# Patient Record
Sex: MALE | Race: WHITE | NOT HISPANIC OR LATINO
[De-identification: names, ages, dates, MRNs, and addresses within clinical notes are randomized per-mention and may not be internally consistent; named-entity substitution may affect disease eponyms.]

---

## 2022-06-20 PROBLEM — Z00.00 ENCOUNTER FOR PREVENTIVE HEALTH EXAMINATION: Status: ACTIVE | Noted: 2022-06-20

## 2022-07-01 ENCOUNTER — APPOINTMENT (OUTPATIENT)
Dept: UROLOGY | Facility: CLINIC | Age: 32
End: 2022-07-01

## 2022-07-01 VITALS
DIASTOLIC BLOOD PRESSURE: 94 MMHG | HEIGHT: 75 IN | HEART RATE: 87 BPM | BODY MASS INDEX: 38.54 KG/M2 | WEIGHT: 310 LBS | SYSTOLIC BLOOD PRESSURE: 135 MMHG

## 2022-07-01 DIAGNOSIS — Z78.9 OTHER SPECIFIED HEALTH STATUS: ICD-10-CM

## 2022-07-01 PROCEDURE — 99203 OFFICE O/P NEW LOW 30 MIN: CPT

## 2022-07-14 PROBLEM — Z78.9 KNOWN HEALTH PROBLEMS: NONE: Status: RESOLVED | Noted: 2022-07-01 | Resolved: 2022-07-14

## 2022-07-14 PROBLEM — Z78.9 SOCIAL ALCOHOL USE: Status: ACTIVE | Noted: 2022-07-01

## 2022-07-14 PROBLEM — Z78.9 NON-SMOKER: Status: ACTIVE | Noted: 2022-07-01

## 2022-07-14 NOTE — PHYSICAL EXAM
[General Appearance - Well Developed] : well developed [Edema] : no peripheral edema [] : no respiratory distress [Abdomen Hernia] : no hernia was discovered [Abdomen Soft] : soft [Urethral Meatus] : meatus normal [Penis Abnormality] : normal circumcised penis [Urinary Bladder Findings] : the bladder was normal on palpation [Scrotum] : the scrotum was normal [Testes Tenderness] : no tenderness of the testes [Testes Mass (___cm)] : there were no testicular masses [FreeTextEntry1] : atrophic left testis [Normal Station and Gait] : the gait and station were normal for the patient's age [Oriented To Time, Place, And Person] : oriented to person, place, and time [Inguinal Lymph Nodes Enlarged Bilaterally] : inguinal

## 2022-07-24 ENCOUNTER — TRANSCRIPTION ENCOUNTER (OUTPATIENT)
Age: 32
End: 2022-07-24

## 2022-07-28 ENCOUNTER — APPOINTMENT (OUTPATIENT)
Dept: UROLOGY | Facility: CLINIC | Age: 32
End: 2022-07-28

## 2022-07-28 PROCEDURE — 99442: CPT

## 2022-09-06 ENCOUNTER — APPOINTMENT (OUTPATIENT)
Dept: UROLOGY | Facility: CLINIC | Age: 32
End: 2022-09-06

## 2022-09-06 PROCEDURE — 99442: CPT

## 2022-09-13 ENCOUNTER — APPOINTMENT (OUTPATIENT)
Dept: UROLOGY | Facility: CLINIC | Age: 32
End: 2022-09-13

## 2022-09-13 PROCEDURE — 99442: CPT

## 2023-03-17 ENCOUNTER — APPOINTMENT (OUTPATIENT)
Dept: UROLOGY | Facility: CLINIC | Age: 33
End: 2023-03-17
Payer: COMMERCIAL

## 2023-03-17 ENCOUNTER — NON-APPOINTMENT (OUTPATIENT)
Age: 33
End: 2023-03-17

## 2023-03-17 VITALS
SYSTOLIC BLOOD PRESSURE: 115 MMHG | OXYGEN SATURATION: 96 % | WEIGHT: 297 LBS | HEIGHT: 75 IN | DIASTOLIC BLOOD PRESSURE: 77 MMHG | BODY MASS INDEX: 36.93 KG/M2 | TEMPERATURE: 96.5 F | HEART RATE: 98 BPM

## 2023-03-17 DIAGNOSIS — Z31.69 ENCOUNTER FOR OTHER GENERAL COUNSELING AND ADVICE ON PROCREATION: ICD-10-CM

## 2023-03-17 PROCEDURE — 93975 VASCULAR STUDY: CPT

## 2023-03-17 PROCEDURE — 99214 OFFICE O/P EST MOD 30 MIN: CPT | Mod: 25

## 2023-03-17 RX ORDER — URSODIOL 250 MG/1
250 TABLET ORAL
Refills: 0 | Status: ACTIVE | COMMUNITY

## 2023-03-20 LAB
ESTRADIOL SERPL-MCNC: 30 PG/ML
FSH SERPL-MCNC: 19.2 IU/L
LH SERPL-ACNC: 5.6 IU/L
TESTOST FREE SERPL-MCNC: 8.9 PG/ML
TESTOST SERPL-MCNC: 380 NG/DL

## 2023-03-20 NOTE — PHYSICAL EXAM
[Urethral Meatus] : meatus normal [Penis Abnormality] : normal circumcised penis [Scrotum] : the scrotum was normal [Epididymis] : the epididymides were normal [Testes Tenderness] : no tenderness of the testes [Testes Mass (___cm)] : there were no testicular masses [FreeTextEntry1] : 10 - 12 cc bilateral testis. Bilateral present vas. Left Grade II Varicocele. Right grade I VARICOCELE

## 2023-03-20 NOTE — HISTORY OF PRESENT ILLNESS
[FreeTextEntry1] : 31 yo. No PMH.\par  to Alicia Bennett, 31 yo. Everything OK, reports has shorter periods.\par Trying to conceive for 2 years. \par Referred by Dr Blevins.\par \par SA 08/2022\par 2 mL azoospermic\par \par Blood workup 05/2022\par \par FSH 19.3\par LH 4.3\par \par Scrotal US 07/2022\par - Small left sided varicocele.\par \par No other complaint.\par No ED. No ejaculatory dysfunction.\par Nonsmoker.\par

## 2023-03-20 NOTE — ASSESSMENT
[FreeTextEntry1] : Hypogonadism - Azoospermia.\par TT FSH LH E2\par New SA\par Karyotype\par Anti sperm Ab\par Y microdeletion. \par CF panel\par Repeat sonogram -3.4mm left varicocele\par role varicocelectomy in nonobsturctive azoo reviewed\par ~20% recovery of sperm to ejac\par improved sensitivity of microTESE\par role upfront microTESE discussed\par f/u 2-4 weeliel

## 2023-03-23 LAB — CFTR MUT TESTED BLD/T: NEGATIVE

## 2023-03-29 LAB — Y CHROMOSOME MICRODELETION, DNA ANALYSIS: NORMAL

## 2023-04-03 LAB
BINDING LOCATION IGA: NORMAL
SPERM AB SMN QL: <20 % BINDING
SPERM IGG BIND LOCATION SMN: NORMAL
SPERM IGG NFR SER IBT: <20 % BINDING

## 2023-05-12 ENCOUNTER — APPOINTMENT (OUTPATIENT)
Dept: UROLOGY | Facility: CLINIC | Age: 33
End: 2023-05-12
Payer: COMMERCIAL

## 2023-05-12 VITALS — SYSTOLIC BLOOD PRESSURE: 130 MMHG | HEART RATE: 73 BPM | DIASTOLIC BLOOD PRESSURE: 77 MMHG | TEMPERATURE: 98.1 F

## 2023-05-12 PROCEDURE — 99214 OFFICE O/P EST MOD 30 MIN: CPT | Mod: 57

## 2023-05-12 NOTE — HISTORY OF PRESENT ILLNESS
[FreeTextEntry1] : returns with his wife\par sono 3.4mm left varicocele\par SA azoo\par normal genetcs\par neg ASA\par FSH 19\par \par here to review

## 2023-05-12 NOTE — ASSESSMENT
[FreeTextEntry1] : left varicocele\par azoospermia\par We had an in depth conversation regarding the benefit of varicocelectomy today. 20% likelihood induction of spermatogenesis per the literature (weak data). weak data suggesting better TESE success rates. We also spoke about the fact that there is a 3 to 6 month delay before improvement is seen in semen parameters. The role of advancing maternal age was discussed in depth.\par \par Surgical risks, including a risk of infection, injury to the testicular artery (extremely rare), injury to the vas deferens and hydrocele were discussed, as was post operative pain and pain control. Post operative skin numbness in the anterior thigh/lateral scrotum were also discussed.\par \par understands may require TESE in future\par opts to proceed

## 2023-06-27 ENCOUNTER — TRANSCRIPTION ENCOUNTER (OUTPATIENT)
Age: 33
End: 2023-06-27

## 2023-06-27 RX ORDER — APREPITANT 80 MG/1
40 CAPSULE ORAL ONCE
Refills: 0 | Status: COMPLETED | OUTPATIENT
Start: 2023-06-28 | End: 2023-06-28

## 2023-06-27 RX ORDER — ACETAMINOPHEN 500 MG
1000 TABLET ORAL ONCE
Refills: 0 | Status: COMPLETED | OUTPATIENT
Start: 2023-06-28 | End: 2023-06-28

## 2023-06-27 NOTE — ASU PATIENT PROFILE, ADULT - FALL HARM RISK - UNIVERSAL INTERVENTIONS
Bed in lowest position, wheels locked, appropriate side rails in place/Call bell, personal items and telephone in reach/Instruct patient to call for assistance before getting out of bed or chair/Non-slip footwear when patient is out of bed/[place] to call system/Physically safe environment - no spills, clutter or unnecessary equipment/Purposeful Proactive Rounding/Room/bathroom lighting operational, light cord in reach

## 2023-06-27 NOTE — ASU PATIENT PROFILE, ADULT - VISION (WITH CORRECTIVE LENSES IF THE PATIENT USUALLY WEARS THEM):
Partially impaired: cannot see medication labels or newsprint, but can see obstacles in path, and the surrounding layout; can count fingers at arm's length glasses  24/7 contacts left at home/Partially impaired: cannot see medication labels or newsprint, but can see obstacles in path, and the surrounding layout; can count fingers at arm's length

## 2023-06-27 NOTE — ASU PATIENT PROFILE, ADULT - NS TRANSFER PATIENT BELONGINGS
Jewelry/Money (specify)/Clothing wallet, ring/Cell Phone/PDA (specify)/Jewelry/Money (specify)/Clothing

## 2023-06-27 NOTE — ASU PATIENT PROFILE, ADULT - CAREGIVER RELATION TO PATIENT
Patient is requesting a call from MA only regarding an X-ray on her left foot possible broken toe  Justyna can be reached at [phone]     Wife

## 2023-06-28 ENCOUNTER — TRANSCRIPTION ENCOUNTER (OUTPATIENT)
Age: 33
End: 2023-06-28

## 2023-06-28 ENCOUNTER — APPOINTMENT (OUTPATIENT)
Dept: UROLOGY | Facility: AMBULATORY SURGERY CENTER | Age: 33
End: 2023-06-28

## 2023-06-28 ENCOUNTER — OUTPATIENT (OUTPATIENT)
Dept: OUTPATIENT SERVICES | Facility: HOSPITAL | Age: 33
LOS: 1 days | Discharge: HOME | End: 2023-06-28
Payer: COMMERCIAL

## 2023-06-28 VITALS
HEIGHT: 75 IN | OXYGEN SATURATION: 98 % | TEMPERATURE: 97 F | WEIGHT: 297.62 LBS | RESPIRATION RATE: 14 BRPM | HEART RATE: 77 BPM | DIASTOLIC BLOOD PRESSURE: 71 MMHG | SYSTOLIC BLOOD PRESSURE: 125 MMHG

## 2023-06-28 VITALS
RESPIRATION RATE: 16 BRPM | DIASTOLIC BLOOD PRESSURE: 61 MMHG | HEART RATE: 79 BPM | TEMPERATURE: 97 F | OXYGEN SATURATION: 97 % | SYSTOLIC BLOOD PRESSURE: 126 MMHG

## 2023-06-28 DIAGNOSIS — D23.70 OTHER BENIGN NEOPLASM OF SKIN OF UNSPECIFIED LOWER LIMB, INCLUDING HIP: Chronic | ICD-10-CM

## 2023-06-28 DIAGNOSIS — I86.1 SCROTAL VARICES: ICD-10-CM

## 2023-06-28 PROCEDURE — 55535 REVISE SPERMATIC CORD VEINS: CPT | Mod: LT

## 2023-06-28 DEVICE — CLIP APPLIER COVIDIEN SURGICLIP 13" LARGE: Type: IMPLANTABLE DEVICE | Status: FUNCTIONAL

## 2023-06-28 DEVICE — CLIP APPLIER ETHICON LIGACLIP 9 3/8" SMALL: Type: IMPLANTABLE DEVICE | Status: FUNCTIONAL

## 2023-06-28 RX ORDER — OXYCODONE AND ACETAMINOPHEN 5; 325 MG/1; MG/1
1 TABLET ORAL
Qty: 10 | Refills: 0
Start: 2023-06-28

## 2023-06-28 RX ORDER — DOCUSATE SODIUM 100 MG
1 CAPSULE ORAL
Qty: 30 | Refills: 0
Start: 2023-06-28 | End: 2023-07-27

## 2023-06-28 RX ORDER — OXYCODONE HYDROCHLORIDE 5 MG/1
5 TABLET ORAL ONCE
Refills: 0 | Status: DISCONTINUED | OUTPATIENT
Start: 2023-06-28 | End: 2023-06-28

## 2023-06-28 RX ORDER — ONDANSETRON 8 MG/1
4 TABLET, FILM COATED ORAL ONCE
Refills: 0 | Status: DISCONTINUED | OUTPATIENT
Start: 2023-06-28 | End: 2023-06-28

## 2023-06-28 RX ORDER — SODIUM CHLORIDE 9 MG/ML
500 INJECTION, SOLUTION INTRAVENOUS
Refills: 0 | Status: DISCONTINUED | OUTPATIENT
Start: 2023-06-28 | End: 2023-06-28

## 2023-06-28 RX ORDER — HYDROMORPHONE HYDROCHLORIDE 2 MG/ML
0.5 INJECTION INTRAMUSCULAR; INTRAVENOUS; SUBCUTANEOUS
Refills: 0 | Status: DISCONTINUED | OUTPATIENT
Start: 2023-06-28 | End: 2023-06-28

## 2023-06-28 RX ADMIN — SODIUM CHLORIDE 100 MILLILITER(S): 9 INJECTION, SOLUTION INTRAVENOUS at 11:46

## 2023-06-28 RX ADMIN — APREPITANT 40 MILLIGRAM(S): 80 CAPSULE ORAL at 07:03

## 2023-06-28 RX ADMIN — Medication 1000 MILLIGRAM(S): at 07:02

## 2023-06-28 NOTE — ASU DISCHARGE PLAN (ADULT/PEDIATRIC) - ASU DC SPECIAL INSTRUCTIONSFT
VARICOCELECTOMY    SURGICAL WOUND: There are often lumps and bumps that can be felt in the scrotum on either or both sides up to two (2) months or more post operatively. These are of no concern and with time they will soften and disappear.  Any “black and blue” bruising areas are expected and will also resolve over weeks.  Normally, there is also swelling of the scrotum post operatively. Sometimes the tissue fluid which causes the swelling migrates to the penile skin and can look alarming; with time, all the swelling will eventually subside but may take weeks.  A scrotal support and scrotal fluffs should be worn at all times for the next few weeks, unless bathing, to minimize this swelling. You may apply an ice-pack for 15 minutes out of every hour for the first 24 -36 hours to minimize pain and swelling.    STITCHES: The stitches in the incision will dissolve and fall out by themselves. Sometimes skin stitches may open, allowing a slight gaping of the incision. This is no problem if you keep the area clean.  There may be a bluish colored waterproof glue over the incision as well – the glue will peel away and fall off on its own over a couple of weeks.     PAIN: You may have some intermittent pain for up to six (6) weeks post operatively. Pain does not signify any problem unless associated with fever, chills, or inability to void.  If you experience any fevers or chills please call immediately as this may be signs of an infection. You may take Tylenol (acetaminophen) 650-975mg and/or Motrin (ibuprofen) 400-600mg, both available over the counter, for pain every 6 hours as needed. Do not exceed 4000mg of Tylenol (acetaminophen) daily. You may alternate these medications such that you take one or the other every 3 hours for around the clock pain coverage. For severe pain, take Percocet 5/325mg every 6 hours.      STOOL SOFTENERS: Do not allow yourself to become constipated as straining may cause bleeding. Take stool softeners or a laxative (ex. Miralax, Colace, Senokot, ExLax, etc), available over the counter, if taking Percocet. For your convenience, all prescriptions are sent to [facility] pharmacy at [address] [place] [address], on the [address] and [address] [place] [address].    ANTICOAGULATION: If you are taking any blood thinning medications, please discuss with your urologist prior to restarting these medications unless otherwise specified.    BATHING: You may sponge bath 24 hours after surgery, but minimize water to the surgical incision and drain.    DIET: You may resume your regular diet and regular medication regimen.    ACTIVITY: No heavy lifting or strenuous exercise until you are evaluated at your post-operative appointment. Otherwise, you may return to your usual level of physical activity.    FOLLOW-UP: If you did not already schedule your post-operative appointment, please call your urologist to schedule and follow-up appointment.    CALL YOUR UROLOGIST IF: You have any bleeding that does not stop, inability to void >8 hours, fever over 100.4 F, chills, persistent nausea/vomiting, changes in your incision concerning for infection, or if your pain is not controlled on your discharge pain medications.

## 2023-06-28 NOTE — ASU DISCHARGE PLAN (ADULT/PEDIATRIC) - NS MD DC FALL RISK RISK
For information on Fall & Injury Prevention, visit: [email or web address] OR  [email or web address] OR  https://www.cdc.gov/steadi/patient.html

## 2023-07-06 ENCOUNTER — NON-APPOINTMENT (OUTPATIENT)
Age: 33
End: 2023-07-06

## 2023-07-12 ENCOUNTER — APPOINTMENT (OUTPATIENT)
Dept: UROLOGY | Facility: CLINIC | Age: 33
End: 2023-07-12
Payer: COMMERCIAL

## 2023-07-12 VITALS — SYSTOLIC BLOOD PRESSURE: 128 MMHG | TEMPERATURE: 98.3 F | HEART RATE: 113 BPM | DIASTOLIC BLOOD PRESSURE: 75 MMHG

## 2023-07-12 PROBLEM — I86.1 LEFT VARICOCELE: Status: ACTIVE | Noted: 2023-05-12

## 2023-07-12 PROCEDURE — 99024 POSTOP FOLLOW-UP VISIT: CPT

## 2023-07-12 NOTE — PHYSICAL EXAM
[Normal Appearance] : normal appearance [General Appearance - In No Acute Distress] : no acute distress [Abdomen Hernia] : no hernia was discovered [Urethral Meatus] : meatus normal [Scrotum] : the scrotum was normal [Testes Mass (___cm)] : there were no testicular masses [FreeTextEntry1] : R incision site healed well, nonerythematous, non tender.  [Heart Rate And Rhythm] : Heart rate and rhythm were normal [Edema] : no peripheral edema [] : no respiratory distress [Respiration, Rhythm And Depth] : normal respiratory rhythm and effort [Exaggerated Use Of Accessory Muscles For Inspiration] : no accessory muscle use [Oriented To Time, Place, And Person] : oriented to person, place, and time [Affect] : the affect was normal [Normal Station and Gait] : the gait and station were normal for the patient's age

## 2023-07-12 NOTE — HISTORY OF PRESENT ILLNESS
[FreeTextEntry1] : 32M here for follow up 2 weeks s/p R varicocele correction\par \par - feels well, without complaints.\par - endorses minimal pain following initial episode, which was relieved through use of ibuprofen and scrotal support. \par - denies dysuria, fever, chills, hematuria, N/V/D, discharge\par - underwent for fertility.

## 2023-07-12 NOTE — ASSESSMENT
[FreeTextEntry1] : 32M here for follow up s/p correction of R varicocele \par \par Varicocele\par - advised to wear supportive underwear. \par - to repeat SA in 3 months\par - reviewed worry symptoms with patient \par

## 2023-07-21 RX ORDER — LEVOCETIRIZINE DIHYDROCHLORIDE 0.5 MG/ML
1 SOLUTION ORAL
Refills: 0 | DISCHARGE

## 2024-03-07 ENCOUNTER — NON-APPOINTMENT (OUTPATIENT)
Age: 34
End: 2024-03-07

## 2024-03-11 PROBLEM — G47.33 OBSTRUCTIVE SLEEP APNEA (ADULT) (PEDIATRIC): Chronic | Status: ACTIVE | Noted: 2023-06-28

## 2024-04-12 ENCOUNTER — APPOINTMENT (OUTPATIENT)
Dept: UROLOGY | Facility: CLINIC | Age: 34
End: 2024-04-12
Payer: COMMERCIAL

## 2024-04-12 VITALS
DIASTOLIC BLOOD PRESSURE: 82 MMHG | TEMPERATURE: 98.6 F | OXYGEN SATURATION: 97 % | SYSTOLIC BLOOD PRESSURE: 142 MMHG | HEART RATE: 86 BPM

## 2024-04-12 PROCEDURE — 99214 OFFICE O/P EST MOD 30 MIN: CPT

## 2024-04-12 NOTE — HISTORY OF PRESENT ILLNESS
[FreeTextEntry1] : 33M hypogonadism primary infertility azoospermia  left varicocele s/p varicocelectomy 6/28/23 remains azoospermia  Labs 5/12/23 FSH 19.2 LH 5.6 E2 30   SA (02/12/24): vol 1.9 azoospermia

## 2024-04-12 NOTE — ASSESSMENT
[FreeTextEntry1] : A/P: 33M w/ hypogonadism - primary infertility - s/p left varicocelectomy 06/28/23 - remains azoospermic - discussed extended search SA vs TESE vs mTESE I had a long conversation with him and his wife regarding the role of surgical sperm retrieval. They understand that some patients harbor pockets of spermatogenesis within the testis that can be retrieved surgically and used with IVF. In general, there is an approximately 45-60% likelihood of successful retrieval. Retrieval options, including a complete microdissection of the testis and more limited testicular biopsies were also discussed. They understand that the microTESE may have the best likelihood of identifying spermatogenesis. Risks of surgery, including hematoma, pain, long term hypogonadism requiring testosterone replacement therapy, were also discussed. will undergo eval with IVF team will review info may schedule by phone - needs to decide if needle based sampling vs microTESE

## 2024-04-23 ENCOUNTER — APPOINTMENT (OUTPATIENT)
Dept: HUMAN REPRODUCTION | Facility: CLINIC | Age: 34
End: 2024-04-23

## 2024-07-15 ENCOUNTER — APPOINTMENT (OUTPATIENT)
Dept: BARIATRICS/WEIGHT MGMT | Facility: CLINIC | Age: 34
End: 2024-07-15

## 2024-07-15 VITALS
HEART RATE: 87 BPM | SYSTOLIC BLOOD PRESSURE: 149 MMHG | WEIGHT: 315 LBS | DIASTOLIC BLOOD PRESSURE: 86 MMHG | RESPIRATION RATE: 18 BRPM | BODY MASS INDEX: 39.37 KG/M2 | OXYGEN SATURATION: 99 %

## 2024-07-15 DIAGNOSIS — E66.01 MORBID (SEVERE) OBESITY DUE TO EXCESS CALORIES: ICD-10-CM

## 2024-07-15 DIAGNOSIS — K76.0 FATTY (CHANGE OF) LIVER, NOT ELSEWHERE CLASSIFIED: ICD-10-CM

## 2024-07-15 DIAGNOSIS — R74.8 ABNORMAL LEVELS OF OTHER SERUM ENZYMES: ICD-10-CM

## 2024-07-15 DIAGNOSIS — Z00.00 ENCOUNTER FOR GENERAL ADULT MEDICAL EXAMINATION W/OUT ABNORMAL FINDINGS: ICD-10-CM

## 2024-07-15 DIAGNOSIS — E78.1 PURE HYPERGLYCERIDEMIA: ICD-10-CM

## 2024-07-15 DIAGNOSIS — E88.810 METABOLIC SYNDROME: ICD-10-CM

## 2024-07-15 PROCEDURE — 99205 OFFICE O/P NEW HI 60 MIN: CPT

## 2024-07-15 PROCEDURE — G2211 COMPLEX E/M VISIT ADD ON: CPT | Mod: NC

## 2024-07-15 RX ORDER — SEMAGLUTIDE 0.5 MG/.5ML
0.5 INJECTION, SOLUTION SUBCUTANEOUS
Qty: 1 | Refills: 1 | Status: ACTIVE | COMMUNITY
Start: 2024-07-15 | End: 1900-01-01

## 2024-07-15 RX ORDER — MULTIVITAMIN/IRON/FOLIC ACID 18MG-0.4MG
TABLET ORAL
Refills: 0 | Status: ACTIVE | COMMUNITY
Start: 2024-07-15

## 2024-07-15 RX ORDER — SEMAGLUTIDE 0.25 MG/.5ML
0.25 INJECTION, SOLUTION SUBCUTANEOUS
Qty: 2 | Refills: 1 | Status: ACTIVE | COMMUNITY
Start: 2024-07-15 | End: 1900-01-01

## 2024-08-01 PROBLEM — Z82.49 FAMILY HISTORY OF HEART DISEASE: Status: ACTIVE | Noted: 2024-08-01

## 2024-08-01 PROBLEM — Z83.3 FAMILY HISTORY OF TYPE 2 DIABETES MELLITUS: Status: ACTIVE | Noted: 2024-08-01

## 2024-08-01 PROBLEM — Z91.89 SEDENTARY LIFESTYLE: Status: ACTIVE | Noted: 2024-08-01

## 2024-08-14 NOTE — ASSESSMENT
[FreeTextEntry1] : Patient is a 31 year male who presents with failure to conceive.  He has ahd a workup with labs and semen analysis that shows azospermia.  No prior hernia or testicular surgery or trauma.  Non smoker.\par \par A/P\par 1. azospermia\par repeat semen analysis and labs\par will call No

## 2024-08-22 ENCOUNTER — APPOINTMENT (OUTPATIENT)
Dept: BARIATRICS/WEIGHT MGMT | Facility: CLINIC | Age: 34
End: 2024-08-22
Payer: COMMERCIAL

## 2024-08-22 DIAGNOSIS — E88.810 METABOLIC SYNDROME: ICD-10-CM

## 2024-08-22 DIAGNOSIS — E78.1 PURE HYPERGLYCERIDEMIA: ICD-10-CM

## 2024-08-22 DIAGNOSIS — R74.8 ABNORMAL LEVELS OF OTHER SERUM ENZYMES: ICD-10-CM

## 2024-08-22 DIAGNOSIS — K76.0 FATTY (CHANGE OF) LIVER, NOT ELSEWHERE CLASSIFIED: ICD-10-CM

## 2024-08-22 DIAGNOSIS — E66.01 MORBID (SEVERE) OBESITY DUE TO EXCESS CALORIES: ICD-10-CM

## 2024-08-22 PROCEDURE — 99214 OFFICE O/P EST MOD 30 MIN: CPT

## 2024-08-22 PROCEDURE — G2211 COMPLEX E/M VISIT ADD ON: CPT | Mod: NC

## 2024-08-23 NOTE — HISTORY OF PRESENT ILLNESS
[FreeTextEntry1] : 33M PMH class II obesity, elevated triglycerides, low HDL, MARLY, exercise-induced asthma, who presents to weight management for follow-up.   She works in [place] and initially presented 7/2024 reporting that he struggled with his weight throughout life, engaged in various diets with regain. Diet noted for skipped breakfasts, occasional desserts, some night snacking, occasional take-out. Sedentary lifestyle. Dx w/MARLY, uses CPAP. Testosterone may be affected by MARLY and excess adipose tissue/diet. We discussed dietary and lifestyle changes.  We discussed medical treatments, he was prescribed Wegovy.    Weight today:  Weight at Initial Visit (7/15/24): 315 lbs, BMI 39.37, BF 43.1%   Medication: He has not started Wegovy, needs PA.   Diet: (B (): Usually skips breakfast L (noon): Leftovers, or a sandwich D (6 pm): 'heartiest meal', chicken or steak w/ rice and vegetable. Pasta on weekends Dessert: rare Carvel Snack: just at night.  Night-time eating: a few pieces of cheese and ham at night, ~8:30 Beverages: Coffee with milk 2% x1, sometimes diet soda <1x/d Fast-food/Restaurants: Take-out lunch most days, dinner restaurant 1-2x/wk Cook/Prepare meals: Added oils: Food Journal: In the past   Exercise:  (No limitations. Occasional walks.    Sleep:  (Was diagnosed with MARLY 6-7 years ago. Uses CPAP which has improved sleep. Typically falls asleep 10:30 pm, up ~7:30 am, estimates 8 hours.   Labs (6/5/23): CBC, CMP, Trig 555, HDL 38, DLDL 108, TSH 1.6. Total/free testosterone (3/17/23)  380, 8.9

## 2024-08-23 NOTE — HISTORY OF PRESENT ILLNESS
[FreeTextEntry1] : 33M PMH class II obesity, elevated triglycerides, low HDL, MARLY, exercise-induced asthma, who presents to weight management for follow-up.   She works in [place] and initially presented 7/2024 reporting that he struggled with his weight throughout life, engaged in various diets with regain. Diet noted for skipped breakfasts, occasional desserts, some night snacking, occasional take-out. Sedentary lifestyle. Dx w/MARLY, uses CPAP. Testosterone may be affected by MARLY and excess adipose tissue/diet. We discussed dietary and lifestyle changes.  We discussed medical treatments, he was prescribed Wegovy.    Weight today:  Weight at Initial Visit (7/15/24): 315 lbs, BMI 39.37, BF 43.1%   Medication: He has not started Wegovy, needs PA.   Diet: (B (): Usually skips breakfast L (noon): Leftovers, or a sandwich D (6 pm): 'heartiest meal', chicken or steak w/ rice and vegetable. Pasta on weekends Dessert: rare Carvel Snack: just at night.  Night-time eating: a few pieces of cheese and ham at night, ~8:30 Beverages: Coffee with milk 2% x1, sometimes diet soda <1x/d Fast-food/Restaurants: Take-out lunch most days, dinner restaurant 1-2x/wk Cook/Prepare meals: Added oils: Food Journal: In the past   Exercise:  (No limitations. Occasional walks.    Sleep:  (Was diagnosed with MARLY 6-7 years ago. Uses CPAP which has improved sleep. Typically falls asleep 10:30 pm, up ~7:30 am, estimates 8 hours.   Labs (6/5/23): CBC, CMP, Trig 555, HDL 38, DLDL 108, TSH 1.6. Total/free testosterone (3/17/23)  380, 8.9

## 2024-08-23 NOTE — PHYSICAL EXAM
[Obese, well nourished, in no acute distress] : obese, well nourished, in no acute distress [de-identified] : TEB visit

## 2024-08-23 NOTE — PHYSICAL EXAM
[Obese, well nourished, in no acute distress] : obese, well nourished, in no acute distress [de-identified] : TEB visit

## 2024-08-23 NOTE — PHYSICAL EXAM
[Obese, well nourished, in no acute distress] : obese, well nourished, in no acute distress [de-identified] : TEB visit

## 2024-08-23 NOTE — ASSESSMENT
[FreeTextEntry1] : 33M PMH class II obesity, elevated triglycerides, low HDL, MARLY, exercise-induced asthma, who presents to weight management for follow-up.   # Metabolic syndrome (class II obesity - central, elevated trig, low HDL) c/b MARLY, borderline low T: Has not yet started Wegovy, needs PA? Has struggled with his weight throughout life, engaged in various diets with regain. Diet noted for skipped breakfasts, occasional desserts, some night snacking, occasional take-out. Sedentary lifestyle. Has MARLY, uses CPAP. Testosterone may be affected by MARLY and excess adipose tissue.  - Food log - Meal planning/prep, may benefit from meal delivery service - Defers dietician at the moment.  - Incorporate resistance exercise.  - labs reviewed with patient - Wegovy prescribed, will f/u on PA - F/u 1 month    PA wegovy sweeteners labs discussed

## 2024-08-23 NOTE — HISTORY OF PRESENT ILLNESS
[FreeTextEntry1] : 33M PMH class II obesity, elevated triglycerides, low HDL, MARLY, exercise-induced asthma, who presents to weight management for follow-up.   She works in [place] and initially presented 7/2024 reporting that he struggled with his weight throughout life, engaged in various diets with regain. Diet noted for skipped breakfasts, occasional desserts, some night snacking, occasional take-out. Sedentary lifestyle. Dx w/MARLY, uses CPAP. Testosterone may be affected by MARLY and excess adipose tissue/diet. We discussed dietary and lifestyle changes.  We discussed medical treatments, he was prescribed Wegovy.    Weight today:  Weight at Initial Visit (7/15/24): 315 lbs, BMI 39.37, BF 43.1%   Medication: He has not started Wegovy, needs PA.   Diet: (B (): Usually skips breakfast L (noon): Leftovers, or a sandwich D (6 pm): 'heartiest meal', chicken or steak w/ rice and vegetable. Pasta on weekends Dessert: rare Carvel Snack: just at night.  Night-time eating: a few pieces of cheese and ham at night, ~8:30 Beverages: Coffee with milk 2% x1, sometimes diet soda <1x/d Fast-food/Restaurants: Take-out lunch most days, dinner restaurant 1-2x/wk Cook/Prepare meals: Added oils: Food Journal: In the past   Exercise:  (No limitations. Occasional walks.    Sleep:  (Was diagnosed with MARLY 6-7 years ago. Uses CPAP which has improved sleep. Typically falls asleep 10:30 pm, up ~7:30 am, estimates 8 hours.   Labs (6/5/23): CBC, CMP, Trig 555, HDL 38, DLDL 108, TSH 1.6. Total/free testosterone (3/17/23)  380, 8.9

## 2024-09-23 ENCOUNTER — APPOINTMENT (OUTPATIENT)
Dept: INTERNAL MEDICINE | Facility: CLINIC | Age: 34
End: 2024-09-23
Payer: COMMERCIAL

## 2024-09-23 ENCOUNTER — NON-APPOINTMENT (OUTPATIENT)
Age: 34
End: 2024-09-23

## 2024-09-23 VITALS
OXYGEN SATURATION: 97 % | BODY MASS INDEX: 38.79 KG/M2 | HEIGHT: 75 IN | TEMPERATURE: 98.2 F | WEIGHT: 312 LBS | HEART RATE: 74 BPM | SYSTOLIC BLOOD PRESSURE: 136 MMHG | DIASTOLIC BLOOD PRESSURE: 84 MMHG | RESPIRATION RATE: 18 BRPM

## 2024-09-23 DIAGNOSIS — Z00.00 ENCOUNTER FOR GENERAL ADULT MEDICAL EXAMINATION W/OUT ABNORMAL FINDINGS: ICD-10-CM

## 2024-09-23 PROCEDURE — 36415 COLL VENOUS BLD VENIPUNCTURE: CPT

## 2024-09-23 PROCEDURE — 93000 ELECTROCARDIOGRAM COMPLETE: CPT

## 2024-09-23 PROCEDURE — 99385 PREV VISIT NEW AGE 18-39: CPT

## 2024-09-23 NOTE — PHYSICAL EXAM
[Obese] : patient was observed to be obese [Normal] : normal gait, coordination grossly intact, no focal deficits [de-identified] : Slight pigment changes to the foreskin.  Slight dryness and irritation

## 2024-09-23 NOTE — HEALTH RISK ASSESSMENT
[Good] : ~his/her~  mood as  good [Yes] : Yes [2 - 3 times a week (3 pts)] : 2 - 3  times a week (3 points) [1 or 2 (0 pts)] : 1 or 2 (0 points) [Never (0 pts)] : Never (0 points) [No] : In the past 12 months have you used drugs other than those required for medical reasons? No [Little interest or pleasure doing things] : 1) Little interest or pleasure doing things [Feeling down, depressed, or hopeless] : 2) Feeling down, depressed, or hopeless [0] : 2) Feeling down, depressed, or hopeless: Not at all (0) [PHQ-2 Negative - No further assessment needed] : PHQ-2 Negative - No further assessment needed [Never] : Never [Audit-CScore] : 0 [[id number]] : 0

## 2024-09-23 NOTE — HISTORY OF PRESENT ILLNESS
[de-identified] : Patient comes to this office for a physical with overall no recent changes in exertional or functional ability.  Continues to follow-up with weight management and is currently awaiting insurance approval for medications.  Also being actively followed by urology for infertility issues and has had sperm analysis testing done already.

## 2024-10-01 ENCOUNTER — APPOINTMENT (OUTPATIENT)
Dept: INTERNAL MEDICINE | Facility: CLINIC | Age: 34
End: 2024-10-01

## 2024-12-11 NOTE — ASU PREOP CHECKLIST - HEIGHT IN INCHES
-- DO NOT REPLY / DO NOT REPLY ALL --  -- This inbox is not monitored. If this was sent to the wrong provider or department, reroute message to P ECO Reroute pool. --  -- Message is from Engagement Center Operations (ECO) --      Message Type:  Refill Medication   Refill request for Pended medication named: n/a  Preferred pharmacy verified, and selected.   [id number] - [address] AT [facility]    Is the patient OUT of Medication?  Yes and During Work Hours Medication Refills handled by Care Site - route as high priority to care site pool on KB. Patient has been advised it will be addressed within 1 business day.     Message: n/a                  
3

## 2025-01-21 ENCOUNTER — APPOINTMENT (OUTPATIENT)
Dept: HUMAN REPRODUCTION | Facility: CLINIC | Age: 35
End: 2025-01-21
Payer: COMMERCIAL

## 2025-01-21 PROCEDURE — 36415 COLL VENOUS BLD VENIPUNCTURE: CPT

## 2025-05-27 ENCOUNTER — APPOINTMENT (OUTPATIENT)
Dept: UROLOGY | Facility: CLINIC | Age: 35
End: 2025-05-27
Payer: COMMERCIAL

## 2025-05-27 ENCOUNTER — NON-APPOINTMENT (OUTPATIENT)
Age: 35
End: 2025-05-27

## 2025-05-27 VITALS
DIASTOLIC BLOOD PRESSURE: 79 MMHG | HEIGHT: 75 IN | HEART RATE: 78 BPM | SYSTOLIC BLOOD PRESSURE: 115 MMHG | TEMPERATURE: 97.5 F | BODY MASS INDEX: 38.79 KG/M2 | WEIGHT: 312 LBS

## 2025-05-27 DIAGNOSIS — E29.1 TESTICULAR HYPOFUNCTION: ICD-10-CM

## 2025-05-27 PROCEDURE — G2211 COMPLEX E/M VISIT ADD ON: CPT | Mod: NC

## 2025-05-27 PROCEDURE — 99214 OFFICE O/P EST MOD 30 MIN: CPT

## 2025-06-01 LAB
ESTRADIOL SERPL-MCNC: 36 PG/ML
FSH SERPL-MCNC: 27.1 IU/L
LH SERPL-ACNC: 8.3 IU/L
TESTOST FREE SERPL-MCNC: 10.2 PG/ML
TESTOST SERPL-MCNC: 616 NG/DL

## 2025-07-23 DIAGNOSIS — E29.1 TESTICULAR HYPOFUNCTION: ICD-10-CM

## 2025-08-12 ENCOUNTER — NON-APPOINTMENT (OUTPATIENT)
Age: 35
End: 2025-08-12

## 2025-09-02 RX ORDER — ACETAMINOPHEN AND CODEINE PHOSPHATE 300; 30 MG/1; MG/1
300-30 TABLET ORAL
Qty: 12 | Refills: 0 | Status: ACTIVE | COMMUNITY
Start: 2025-09-02 | End: 1900-01-01

## 2025-09-02 RX ORDER — CEPHALEXIN 500 MG/1
500 CAPSULE ORAL
Qty: 3 | Refills: 0 | Status: ACTIVE | COMMUNITY
Start: 2025-09-02 | End: 1900-01-01

## 2025-09-05 ENCOUNTER — APPOINTMENT (OUTPATIENT)
Dept: HUMAN REPRODUCTION | Facility: CLINIC | Age: 35
End: 2025-09-05

## 2025-09-05 ENCOUNTER — NON-APPOINTMENT (OUTPATIENT)
Age: 35
End: 2025-09-05

## 2025-09-05 ENCOUNTER — APPOINTMENT (OUTPATIENT)
Dept: UROLOGY | Facility: CLINIC | Age: 35
End: 2025-09-05
Payer: COMMERCIAL

## 2025-09-05 VITALS
HEIGHT: 75 IN | DIASTOLIC BLOOD PRESSURE: 56 MMHG | SYSTOLIC BLOOD PRESSURE: 102 MMHG | TEMPERATURE: 97.8 F | HEART RATE: 88 BPM | BODY MASS INDEX: 38.79 KG/M2 | WEIGHT: 312 LBS

## 2025-09-05 PROCEDURE — 89322 SEMEN ANAL STRICT CRITERIA: CPT

## 2025-09-05 PROCEDURE — 54505 BIOPSY OF TESTIS: CPT | Mod: LT

## 2025-09-09 ENCOUNTER — NON-APPOINTMENT (OUTPATIENT)
Age: 35
End: 2025-09-09

## 2025-09-16 ENCOUNTER — NON-APPOINTMENT (OUTPATIENT)
Age: 35
End: 2025-09-16

## (undated) DEVICE — CATH IV SAFE BC 24G X 0.75" (YELLOW)

## (undated) DEVICE — DRAIN PENROSE 5/8" X 18" LATEX

## (undated) DEVICE — SUT SILK 4-0 18" TIES

## (undated) DEVICE — WARMING BLANKET UPPER ADULT

## (undated) DEVICE — ELCTR BIPOLAR CORD 12FT

## (undated) DEVICE — SLV COMPRESSION KNEE MED

## (undated) DEVICE — DRSG SUPPORTER ADULT 3" WAISTBAND LRG

## (undated) DEVICE — GLV 7.5 PROTEXIS (WHITE)

## (undated) DEVICE — SUT MONOCRYL 4-0 27" PS-2 UNDYED

## (undated) DEVICE — SUT VICRYL 3-0 27" SH UNDYED

## (undated) DEVICE — SYR LUER LOK 10CC

## (undated) DEVICE — SYR LUER LOK 3CC

## (undated) DEVICE — DRSG KERLIX ROLL 4.5"

## (undated) DEVICE — DRAPE MICROSCOPE ZEISS

## (undated) DEVICE — VESSEL LOOP MINI-BLUE 0.075" X 16"

## (undated) DEVICE — DOPPLER PROBE 20MHZ DISP

## (undated) DEVICE — PACK GENERAL MINOR